# Patient Record
Sex: MALE | Race: WHITE | Employment: OTHER | URBAN - METROPOLITAN AREA
[De-identification: names, ages, dates, MRNs, and addresses within clinical notes are randomized per-mention and may not be internally consistent; named-entity substitution may affect disease eponyms.]

---

## 2017-06-27 PROBLEM — M79.644 PAIN IN FINGER OF RIGHT HAND: Status: ACTIVE | Noted: 2017-06-27

## 2018-08-30 PROCEDURE — 86480 TB TEST CELL IMMUN MEASURE: CPT | Performed by: FAMILY MEDICINE

## 2018-08-30 PROCEDURE — 86803 HEPATITIS C AB TEST: CPT | Performed by: FAMILY MEDICINE

## 2020-01-14 ENCOUNTER — HOSPITAL ENCOUNTER (OUTPATIENT)
Dept: CT IMAGING | Age: 65
Discharge: HOME | End: 2020-01-14
Attending: FAMILY MEDICINE

## 2020-01-14 DIAGNOSIS — Z53.20 SCREENING DECLINED BY PATIENT: ICD-10-CM

## 2020-01-14 NOTE — PROGRESS NOTES
Pt [facility]:  PRELIMINARY SCORE= 159.0  BP= 150/78 (takes at home 120's/70) no meds  Cholestec labs as follows: nonfasting  TC= 244  HDL= 83  LDL= 147 (no meds)  TG= 68  GLUCOSE= 117    To be scheduled for Free PV screening.     All r

## 2020-01-31 ENCOUNTER — HOSPITAL ENCOUNTER (OUTPATIENT)
Dept: ULTRASOUND IMAGING | Age: 65
Discharge: HOME | End: 2020-01-31
Attending: FAMILY MEDICINE

## 2020-01-31 DIAGNOSIS — Z13.9 ENCOUNTER FOR SCREENING: ICD-10-CM
